# Patient Record
Sex: FEMALE | Race: WHITE
[De-identification: names, ages, dates, MRNs, and addresses within clinical notes are randomized per-mention and may not be internally consistent; named-entity substitution may affect disease eponyms.]

---

## 2020-10-30 ENCOUNTER — HOSPITAL ENCOUNTER (EMERGENCY)
Dept: HOSPITAL 76 - ED | Age: 30
Discharge: HOME | End: 2020-10-30
Payer: MEDICAID

## 2020-10-30 VITALS — DIASTOLIC BLOOD PRESSURE: 95 MMHG | SYSTOLIC BLOOD PRESSURE: 132 MMHG

## 2020-10-30 DIAGNOSIS — J02.9: Primary | ICD-10-CM

## 2020-10-30 DIAGNOSIS — K21.9: ICD-10-CM

## 2020-10-30 DIAGNOSIS — M54.2: ICD-10-CM

## 2020-10-30 PROCEDURE — 99283 EMERGENCY DEPT VISIT LOW MDM: CPT

## 2020-10-30 PROCEDURE — 87070 CULTURE OTHR SPECIMN AEROBIC: CPT

## 2020-10-30 PROCEDURE — 99284 EMERGENCY DEPT VISIT MOD MDM: CPT

## 2020-10-30 PROCEDURE — 87430 STREP A AG IA: CPT

## 2020-10-30 NOTE — ED PHYSICIAN DOCUMENTATION
PD HPI HEENT





- Stated complaint


Stated Complaint: SOAR THROAT





- History obtained from


History obtained from: Patient





- History of Present Illness


Timing - onset: How many months ago (6 months of fairly frequent sore throat and

then more consistent the past 2 months. Has seen PMD and then ENT in [facility]. Has had CLaritin and FLonase without improvment. Is getting CT neck in 10 

days, and f/u ENT appt after that.)


Timing - details: Gradual onset, Waxing and waning


Location: Throat, Other (anterior neck)


Associated symptoms: No: Fever, Congestion, Facial swelling, Cough


Similar symptoms before: No diagnosis


Recently seen: Clinic (PMD and then ENT, with labs to eval thyroid, 

strep/culture, mono, without diagnosis.)





Review of Systems


Constitutional: denies: Fever


Nose: denies: Rhinorrhea / runny nose, Congestion


Throat: reports: Sore throat


Respiratory: denies: Cough


Skin: denies: Rash, Lesions


Musculoskeletal: reports: Neck pain (anterior)





PD PAST MEDICAL HISTORY





- Past Medical History


Neuro: None


Endocrine/Autoimmune: None


GI: None, GERD (sometimes)


Psych: Bipolar disorder





- Present Medications


Home Medications: 


                                Ambulatory Orders











 Medication  Instructions  Recorded  Confirmed


 


Famotidine 20 mg PO DAILY #30 tablet 10/30/20 


 


Hydrocodone/Acetaminophen 1 each PO Q6H PRN #15 tablet 10/30/20 





[Hydrocodone-Acetamin 5-325 mg]   


 


Levothyroxine [Synthroid] 50 mcg DAILY 10/30/20 10/30/20


 


Lithium 0 mg DAILY 10/30/20 10/30/20


 


dexAMETHasone [Decadron] 4 mg PO DAILY #5 tablet 10/30/20 


 


lamoTRIgine [LaMICtal] 100 mg DAILY 10/30/20 10/30/20














- Allergies


Allergies/Adverse Reactions: 


                                    Allergies











Allergy/AdvReac Type Severity Reaction Status Date / Time


 


No Known Drug Allergies Allergy   Verified 10/30/20 13:01














PD ED PE NORMAL





- Vitals


Vital signs reviewed: Yes





- General


General: Alert and oriented X 3, No acute distress, Well developed/nourished





- HEENT


HEENT: Moist mucous membranes, Pharynx benign





- Neck


Neck: Supple, no meningeal sign, No adenopathy, Thyroid normal, Other (anterior 

neck is tender in muscles but no noted swelling/masses. )





- Cardiac


Cardiac: RRR, No murmur





- Respiratory


Respiratory: Clear bilaterally





- Abdomen


Abdomen: Soft, Non tender





- Derm


Derm: Normal color, Warm and dry





Results





- Vitals


Vitals: 


                               Vital Signs - 24 hr











  10/30/20





  13:01


 


Temperature 36.5 C


 


Heart Rate 90


 


Respiratory 16





Rate 


 


Blood Pressure 132/95 H


 


O2 Saturation 97








                                     Oxygen











O2 Source                      Room air

















- Labs


Labs: 


                                Laboratory Tests











  10/30/20





  13:12


 


Group A Strep Rapid  Negative














PD MEDICAL DECISION MAKING





- ED course


Complexity details: considered differential (sore throat for months, and is in 

workup with ENT in [place]. Has CT ? soft tissue neck scheduled 11/10 and appt 

following week with the ENT. ), d/w patient


ED course: 





She prefers to continue timeframe planned (I discussed getting CT here, but she 

was concerned about getting the right test and the ENT having easy access to 

images. These are reasonable. Her symptoms could be suggestive of reflux or 

sinus drainage, in addition to soft tissue factors on neck. 





Departure





- Departure


Disposition: 01 Home, Self Care


Clinical Impression: 


 Sore throat





Condition: Stable


Record reviewed to determine appropriate education?: Yes


Follow-Up: 


[facility] [Provider Group]


Prescriptions: 


dexAMETHasone [Decadron] 4 mg PO DAILY #5 tablet


Famotidine 20 mg PO DAILY #30 tablet


Hydrocodone/Acetaminophen [Hydrocodone-Acetamin 5-325 mg] 1 each PO Q6H PRN #15 

tablet


 PRN Reason: Pain


Comments: 


Follow up for the CT scan and see the ENT specialist as planned.





Continue with your Claritin daily and your usual medications.





I would consider the possibilities of may be sinus drainage and inflammation 

causing the pain and so continuing the Claritin we can also add Decadron steroid

for inflammation daily for 5 more days.





Other consideration would be reflux causing pain in the throat and so I would 

suggest adding famotidine daily for the next month.





Return if worsening symptoms generally.  Follow-up with your ENT as planned.  

Obtain a local provider for primary care.





Tylenol every 4-6 hours (4 times a day) for pain and add hydrocodone if needed 

for worse pain.


Discharge Date/Time: 10/30/20 14:04

## 2020-11-08 ENCOUNTER — HOSPITAL ENCOUNTER (EMERGENCY)
Dept: HOSPITAL 76 - ED | Age: 30
Discharge: HOME | End: 2020-11-08
Payer: MEDICAID

## 2020-11-08 VITALS — DIASTOLIC BLOOD PRESSURE: 80 MMHG | SYSTOLIC BLOOD PRESSURE: 130 MMHG

## 2020-11-08 DIAGNOSIS — R07.89: Primary | ICD-10-CM

## 2020-11-08 PROCEDURE — 99283 EMERGENCY DEPT VISIT LOW MDM: CPT

## 2020-11-08 PROCEDURE — 93005 ELECTROCARDIOGRAM TRACING: CPT

## 2020-11-08 PROCEDURE — 99284 EMERGENCY DEPT VISIT MOD MDM: CPT

## 2020-11-08 NOTE — ED PHYSICIAN DOCUMENTATION
History of Present Illness





- Stated complaint


Stated Complaint: CHEST PAIN, SOA





- Chief complaint


Chief Complaint: Cardiac





- History obtained from


History obtained from: Patient





- Additonal information


Additional information: 


PT comes to the ED, c/o a tight chest discomfort that has been going on 

episodically for months.  Pt has been worked up, and told she has anxiety.  She 

states that she had another episode today, and is concerned that it is on both 

sides of her chest.  She wonders if it could mean that she has cancer.  She 

denies cough, SOB, weight loss or fevers.  Pt states the discomfort is worse 

with deep breaths.  Pt denies smoking or diabetes.  No family members with MI at

a young age.








Review of Systems


Ten Systems: 10 systems reviewed and negative


Constitutional: reports: Reviewed and negative


Eyes: reports: Reviewed and negative


Ears: reports: Reviewed and negative


Nose: reports: Reviewed and negative


Throat: reports: Reviewed and negative


Cardiac: reports: Chest pain / pressure


Respiratory: reports: Reviewed and negative


GI: reports: Reviewed and negative


: reports: Reviewed and negative


Skin: reports: Reviewed and negative


Musculoskeletal: reports: Reviewed and negative


Neurologic: reports: Reviewed and negative


Psychiatric: reports: Reviewed and negative


Endocrine: reports: Reviewed and negative


Immunocompromised: reports: Reviewed and negative





PD PAST MEDICAL HISTORY





- Past Medical History


Past Medical History: Yes


Neuro: None


Endocrine/Autoimmune: None


GI: None, GERD


Psych: Bipolar disorder





- Past Surgical History


Past Surgical History: Yes


HEENT: Tonsil/Adenoidectomy





- Present Medications


Home Medications: 


                                Ambulatory Orders











 Medication  Instructions  Recorded  Confirmed


 


Famotidine 20 mg PO DAILY #30 tablet 10/30/20 11/08/20


 


Levothyroxine [Synthroid] 50 mcg DAILY 10/30/20 11/08/20


 


Lithium 0 mg DAILY 10/30/20 11/08/20


 


lamoTRIgine [LaMICtal] 100 mg DAILY 10/30/20 11/08/20


 


Gabapentin [Neurontin] 300 mg PO HS 11/08/20 11/08/20


 


risperiDONE [Risperdal] 2 mg PO QPM 11/08/20 11/08/20














- Allergies


Allergies/Adverse Reactions: 


                                    Allergies











Allergy/AdvReac Type Severity Reaction Status Date / Time


 


No Known Drug Allergies Allergy   Verified 11/08/20 10:49














- Social History


Does the pt smoke?: No


Smoking Status: Never smoker





PD ED PE NORMAL





- Vitals


Vital signs reviewed: Yes





- General


General: Alert and oriented X 3, No acute distress





- HEENT


HEENT: Atraumatic, PERRL, EOMI, Moist mucous membranes





- Neck


Neck: Supple, no meningeal sign





- Cardiac


Cardiac: RRR, No murmur, Strong equal pulses





- Respiratory


Respiratory: No respiratory distress, Clear bilaterally





- Abdomen


Abdomen: Soft, Non tender, Non distended





- Derm


Derm: Warm and dry





- Extremities


Extremities: No deformity





- Neuro


Neuro: Alert and oriented X 3





- Psych


Psych: Normal mood, Normal affect





Results





- Vitals


Vitals: 


                                     Oxygen











O2 Source                      Room air

















- EKG (time done)


  ** 10:56


Rate: Rate (enter#) (76)


Rhythm: NSR


Axis: Normal


Intervals: Normal MT


QRS: Normal


Ischemia: Normal ST segments.  No: T wave inversion


Compare to prior EKG: Old EKG unavailable


Computer interpretation: Agree with computer





PD MEDICAL DECISION MAKING





- ED course


Complexity details: reviewed results, re-evaluated patient, considered 

differential, d/w patient


ED course: 


Pt was very low-risk both by history and physical exam, for emergent causes of 

chest pain.  Her EKG is normal today.  I have reassured her that the sx she has 

and those she lacks do not point to cancer as a cause of what she has been 

experiencing.  We have discussed symptomatic management and the need for primary

 care follow-up. We have discussed the usual indications for return.








Departure





- Departure


Disposition: 01 Home, Self Care


Clinical Impression: 


 Chest wall pain





Condition: Stable


Instructions:  ED Chest Pain NonCardiac


Comments: 


Your EKG looks good.  There is no evidence of a serious or emergent cause of 

your chest pain today.  What you are describing sounds most likely to be coming 

from the structures of your chest wall, rather than the internal organs or vital

 structures.  As we have discussed, heat, massage, movement in the form of light

 exercise, such as walking, and deep breathing exercises can help with some of 

this tension and discomfort.  Exercise is also helpful for anxiety.  You may 

take ibuprofen as needed to help with any discomfort.  Please follow-up with 

your primary care physician.


Discharge Date/Time: 11/08/20 12:42